# Patient Record
Sex: MALE | Race: WHITE
[De-identification: names, ages, dates, MRNs, and addresses within clinical notes are randomized per-mention and may not be internally consistent; named-entity substitution may affect disease eponyms.]

---

## 2018-02-14 ENCOUNTER — HOSPITAL ENCOUNTER (OUTPATIENT)
Dept: HOSPITAL 91 - HKI | Age: 68
Discharge: HOME | End: 2018-02-14
Payer: MEDICARE

## 2018-02-14 ENCOUNTER — HOSPITAL ENCOUNTER (OUTPATIENT)
Age: 68
Discharge: HOME | End: 2018-02-14

## 2018-02-14 DIAGNOSIS — I69.959: ICD-10-CM

## 2018-02-14 DIAGNOSIS — I10: ICD-10-CM

## 2018-02-14 DIAGNOSIS — M16.12: Primary | ICD-10-CM

## 2019-04-24 ENCOUNTER — HOSPITAL ENCOUNTER (OUTPATIENT)
Dept: HOSPITAL 91 - HKI | Age: 69
Discharge: HOME | End: 2019-04-24
Payer: MEDICARE

## 2019-04-24 ENCOUNTER — HOSPITAL ENCOUNTER (OUTPATIENT)
Dept: HOSPITAL 10 - HKI | Age: 69
Discharge: HOME | End: 2019-04-24
Payer: MEDICARE

## 2019-04-24 DIAGNOSIS — M25.552: Primary | ICD-10-CM

## 2019-04-24 PROCEDURE — G0463 HOSPITAL OUTPT CLINIC VISIT: HCPCS

## 2019-04-24 PROCEDURE — 73502 X-RAY EXAM HIP UNI 2-3 VIEWS: CPT

## 2019-04-25 NOTE — RADRPT
PROCEDURE:   XR Left hip and pelvis. 

 

CLINICAL INDICATION:   Left hip pain and pelvic pain. 

 

TECHNIQUE:   3 views.  Frontal pelvis.  Frontal and lateral left hip.

 

COMPARISON:   None. 

 

FINDINGS:

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
rmal left acetabulum. The right hip is grossly normal aside from mild degenerative change.

Vascular calcifications are present consistent with atherosclerosis.

There are degenerative changes of the lower lumbar spine.

There is no lytic or blastic lesion.

There is no radiopaque foreign body. 

 

IMPRESSION:

1.  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

2.  Mild degenerative change of the right hip.

3.  Atherosclerosis.

4.  Degenerative changes of the lower lumbar spine.

5.  Otherwise unremarkable study.

 

RPTAT: QQ

_____________________________________________ 

.Yogesh West MD, MD           Date    Time 

Electronically viewed and signed by .Yogesh West MD, MD on 04/25/2019 17:05 

 

D:  04/25/2019 17:05  T:  04/25/2019 17:05

.R/